# Patient Record
Sex: FEMALE | Race: WHITE | URBAN - METROPOLITAN AREA
[De-identification: names, ages, dates, MRNs, and addresses within clinical notes are randomized per-mention and may not be internally consistent; named-entity substitution may affect disease eponyms.]

---

## 2021-09-21 ENCOUNTER — WEB ENCOUNTER (OUTPATIENT)
Dept: URBAN - METROPOLITAN AREA CLINIC 92 | Facility: CLINIC | Age: 56
End: 2021-09-21

## 2021-09-22 ENCOUNTER — DASHBOARD ENCOUNTERS (OUTPATIENT)
Age: 56
End: 2021-09-22

## 2021-09-22 ENCOUNTER — WEB ENCOUNTER (OUTPATIENT)
Dept: URBAN - METROPOLITAN AREA CLINIC 12 | Facility: CLINIC | Age: 56
End: 2021-09-22

## 2021-09-22 ENCOUNTER — OFFICE VISIT (OUTPATIENT)
Dept: URBAN - METROPOLITAN AREA CLINIC 12 | Facility: CLINIC | Age: 56
End: 2021-09-22
Payer: COMMERCIAL

## 2021-09-22 DIAGNOSIS — R19.8 CHANGE IN BOWEL MOVEMENT: ICD-10-CM

## 2021-09-22 PROCEDURE — 99243 OFF/OP CNSLTJ NEW/EST LOW 30: CPT | Performed by: INTERNAL MEDICINE

## 2021-09-22 NOTE — HPI-TODAY'S VISIT:
Prior workup-the patient had a colonoscopy in December 2017 which showed small diverticula otherwise unremarkable. This was performed with Dr. Chavis.  The patient is referred by dr. Naya Gray.  A copy of this note will be sent to the referring physician -states that since about January she has noticed that while she has a bm daily, she states that the shape has changed into small nuggets.  she feels that it is never changed from that .  she has no problem passing them at times.  feels that they just fall right out.  feels that sometimes they could be smaller caliber, also a pellet size.   -she states that there are some new prescriptions she started in the past year.  she states that she has been on estrogen for the past year.  on gabapentin and diclofenac.   -her weight has gone up-  -she drinks about 60+ ounces of water a day -she is getting a good amount of fiber a day -she isn't using a stool softener -she isn't trying any probiotics at this time -she has had her routine labs checked with her pcp, including tsh --there are no new changes in her diet -denies any bloating, abdominal pain that accompany this change -no blood in the stool  -denies any n/v, heartburn that goes along with these symptoms -states that there is no etoh, some tobacco -she hasn't seen a gynecologist- has a pcp doing her pap smeals

## 2021-12-07 ENCOUNTER — OFFICE VISIT (OUTPATIENT)
Dept: URBAN - METROPOLITAN AREA SURGERY CENTER 15 | Facility: SURGERY CENTER | Age: 56
End: 2021-12-07
Payer: COMMERCIAL

## 2021-12-07 DIAGNOSIS — R19.4 ALTERATION IN BOWEL ELIMINATION: ICD-10-CM

## 2021-12-07 PROCEDURE — G8907 PT DOC NO EVENTS ON DISCHARG: HCPCS | Performed by: INTERNAL MEDICINE

## 2021-12-07 PROCEDURE — 45378 DIAGNOSTIC COLONOSCOPY: CPT | Performed by: INTERNAL MEDICINE
